# Patient Record
Sex: FEMALE | Race: WHITE | Employment: PART TIME | URBAN - METROPOLITAN AREA
[De-identification: names, ages, dates, MRNs, and addresses within clinical notes are randomized per-mention and may not be internally consistent; named-entity substitution may affect disease eponyms.]

---

## 2021-08-19 ENCOUNTER — EMPLOYEE HEALTH (OUTPATIENT)
Dept: OTHER | Facility: HOSPITAL | Age: 26
End: 2021-08-19
Attending: PREVENTIVE MEDICINE

## 2021-08-19 DIAGNOSIS — Z11.1 SCREENING-PULMONARY TB: Primary | ICD-10-CM

## 2021-08-19 PROCEDURE — 86480 TB TEST CELL IMMUN MEASURE: CPT

## 2021-08-21 LAB
M TB IFN-G CD4+ T-CELLS BLD-ACNC: 0.04 IU/ML
M TB TUBERC IFN-G BLD QL: NEGATIVE
M TB TUBERC IGNF/MITOGEN IGNF CONTROL: >10 IU/ML
QFT TB1 AG MINUS NIL: -0.01 IU/ML
QFT TB2 AG MINUS NIL: -0.01 IU/ML

## 2023-01-06 ENCOUNTER — TELEPHONE (OUTPATIENT)
Dept: INTERNAL MEDICINE CLINIC | Facility: HOSPITAL | Age: 28
End: 2023-01-06

## 2024-12-24 ENCOUNTER — APPOINTMENT (OUTPATIENT)
Dept: GENERAL RADIOLOGY | Facility: HOSPITAL | Age: 29
End: 2024-12-24
Attending: EMERGENCY MEDICINE
Payer: OTHER MISCELLANEOUS

## 2024-12-24 ENCOUNTER — HOSPITAL ENCOUNTER (EMERGENCY)
Facility: HOSPITAL | Age: 29
Discharge: HOME | End: 2024-12-24
Attending: EMERGENCY MEDICINE
Payer: OTHER MISCELLANEOUS

## 2024-12-24 VITALS
RESPIRATION RATE: 16 BRPM | WEIGHT: 125 LBS | DIASTOLIC BLOOD PRESSURE: 87 MMHG | SYSTOLIC BLOOD PRESSURE: 123 MMHG | OXYGEN SATURATION: 99 % | BODY MASS INDEX: 21 KG/M2 | HEART RATE: 79 BPM

## 2024-12-24 DIAGNOSIS — S50.02XA CONTUSION OF LEFT ELBOW, INITIAL ENCOUNTER: Primary | ICD-10-CM

## 2024-12-24 PROCEDURE — 73080 X-RAY EXAM OF ELBOW: CPT | Performed by: EMERGENCY MEDICINE

## 2024-12-24 PROCEDURE — 99283 EMERGENCY DEPT VISIT LOW MDM: CPT

## 2024-12-24 PROCEDURE — 99284 EMERGENCY DEPT VISIT MOD MDM: CPT

## 2024-12-24 RX ORDER — IBUPROFEN 600 MG/1
600 TABLET, FILM COATED ORAL ONCE
Status: COMPLETED | OUTPATIENT
Start: 2024-12-24 | End: 2024-12-24

## 2024-12-24 NOTE — ED INITIAL ASSESSMENT (HPI)
Pt was assisting a patient in the bathroom who lost their balance and fell to the ground, taking Brittany to the ground.   Occurred approx 0930.  C/o elbow pain that got worse over time.  Cms intact distally.

## 2024-12-24 NOTE — ED PROVIDER NOTES
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 She denies weakness, numbness, paresthesias.       History reviewed.   Past Medical History:    High cholesterol       History reviewed. History reviewed. No pertinent surgical history.      Medications :  Prescriptions Prior to Admission[1]     Family History   Problem Relation Age of Onset    Diabetes Father     Other (bladder ca) Paternal Grandmother     Other (NHL) Other        Smoking Status:   Social History     Socioeconomic History    Marital status: Single   Tobacco Use    Smoking status: Never    Smokeless tobacco: Never   Vaping Use    Vaping status: Never Used   Substance and Sexual Activity    Alcohol use: Yes    Drug use: Never   Other Topics Concern    Seat Belt Yes       Constitutional and vital signs reviewed.      Social History and Family History elements reviewed from today, pertinent positives to the presenting problem noted.    Physical Exam     ED Triage Vitals [12/24/24 1130]   /87   Pulse 79   Resp 16   Temp    Temp src    SpO2 99 %   O2 Device        All measures to prevent infection transmission during my interaction with the patient were taken. The patient was already wearing a droplet mask on my arrival to the room. Personal protective equipment was worn throughout the duration of the exam.  Handwashing was performed prior to and after the exam.  Stethoscope and any equipment used during my examination was cleaned with super sani-cloth germicidal wipes following the exam.     Physical Exam    General: NAD  Head: Normocephalic and atraumatic.  Mouth/Throat/Ears/Nose: No hoarseness of voice  Eyes: Conjunctivae and EOM are normal.  Neck: Normal range of motion. Supple.    Cardiovascular: Normal rate  Respiratory/Chest: No tachypnea.  Gastrointestinal: Nondistended  Musculoskeletal:No swelling or deformity. L olecranon mild ttp. No open lesions. 2+ radial pulse, SILT, normal AOK and thumbs up signs, 5/5 hand  strength   Neurological: Alert and appropriate.   Skin: Skin is warm and dry. No pallor.  Psychiatric: Has a normal mood and affect.      ED Course      Labs Reviewed - No data to display    As Interpreted by me    Imaging Results Available and Reviewed while in ED: XR ELBOW, COMPLETE (MIN 3 VIEWS), LEFT (CPT=73080)    Result Date: 12/24/2024  CONCLUSION: No acute osseous abnormality of the left elbow.    Dictated by (CST): Jus Markham MD on 12/24/2024 at 12:20 PM     Finalized by (CST): Jus Markham MD on 12/24/2024 at 12:21 PM         ED Medications Administered:   Medications   ibuprofen (Motrin) tab 600 mg (600 mg Oral Given 12/24/24 1137)         MDM     Vitals:    12/24/24 1130   BP: 123/87   Pulse: 79   Resp: 16   SpO2: 99%   Weight: 56.7 kg     *I personally reviewed and interpreted all ED vitals.    Pulse Ox: 99%, Room air, Normal          Medical Decision Making      Differential Diagnosis/ Diagnostic Considerations: elbow fracture vs contusion     Complicating Factors: The patient already has does not have a problem list on file. to contribute to the complexity of this ED evaluation.    I reviewed prior chart records including office note from 10/25/24. Left elbow contusion, XR without fracture on my interpretation. Pain controlled     Dc In stable condition.  Patient is comfortable with the plan.       Prescriptions:otc ibuprofen for pain         Disposition and Plan     Clinical Impression:  1. Contusion of left elbow, initial encounter        Disposition:  Discharge    Follow-up:  [facility]  [address]  [facility] [address] [phone]  Call in 1 day  Work related injury follow up      Medications Prescribed:  Current  Discharge Medication List                           [1] (Not in a hospital admission)

## 2025-01-22 ENCOUNTER — TELEPHONE (OUTPATIENT)
Dept: INTERNAL MEDICINE UNIT | Facility: HOSPITAL | Age: 30
End: 2025-01-22

## 2025-01-22 DIAGNOSIS — R11.0 NAUSEA: Primary | ICD-10-CM

## 2025-01-22 RX ORDER — ONDANSETRON 4 MG/1
4 TABLET, ORALLY DISINTEGRATING ORAL EVERY 4 HOURS PRN
Qty: 27 TABLET | Refills: 0 | Status: SHIPPED | OUTPATIENT
Start: 2025-01-22

## (undated) NOTE — LETTER
Date & Time: 12/24/2024, 12:32 PM  Patient: Brittany Duckworth  Encounter Provider(s):    Rashaun Carrera MD       To Whom It May Concern:    Brittany Duckworth was seen and treated in our department on 12/24/2024. She can return to work.    If you have any questions or concerns, please do not hesitate to call.        _____________________________  Physician/APC Signature